# Patient Record
Sex: MALE | Race: WHITE | NOT HISPANIC OR LATINO
[De-identification: names, ages, dates, MRNs, and addresses within clinical notes are randomized per-mention and may not be internally consistent; named-entity substitution may affect disease eponyms.]

---

## 2017-02-03 ENCOUNTER — FOLLOW UP (OUTPATIENT)
Age: 47
End: 2017-02-03

## 2017-02-03 DIAGNOSIS — E11.3311: ICD-10-CM

## 2017-02-03 DIAGNOSIS — Z79.4: ICD-10-CM

## 2017-02-03 DIAGNOSIS — E11.3312: ICD-10-CM

## 2017-02-03 DIAGNOSIS — H18.831: ICD-10-CM

## 2017-02-03 PROCEDURE — 92014 COMPRE OPH EXAM EST PT 1/>: CPT | Mod: 24

## 2017-02-03 PROCEDURE — 92134 CPTRZ OPH DX IMG PST SGM RTA: CPT

## 2017-02-03 ASSESSMENT — VISUAL ACUITY
OD_CC: 20/20
OS_CC: 20/20

## 2017-02-03 ASSESSMENT — TONOMETRY
OD_IOP_MMHG: 13
OS_IOP_MMHG: 12

## 2017-05-05 ENCOUNTER — FOLLOW UP (OUTPATIENT)
Age: 47
End: 2017-05-05

## 2017-05-05 DIAGNOSIS — E11.3311: ICD-10-CM

## 2017-05-05 DIAGNOSIS — E11.3312: ICD-10-CM

## 2017-05-05 DIAGNOSIS — Z79.4: ICD-10-CM

## 2017-05-05 PROCEDURE — 92134 CPTRZ OPH DX IMG PST SGM RTA: CPT

## 2017-05-05 PROCEDURE — 92014 COMPRE OPH EXAM EST PT 1/>: CPT

## 2017-05-05 ASSESSMENT — TONOMETRY
OD_IOP_MMHG: 18
OS_IOP_MMHG: 18

## 2017-05-05 ASSESSMENT — VISUAL ACUITY
OS_CC: 20/20
OD_CC: 20/20

## 2017-06-30 ENCOUNTER — FOLLOW UP (OUTPATIENT)
Age: 47
End: 2017-06-30

## 2017-06-30 DIAGNOSIS — Z79.4: ICD-10-CM

## 2017-06-30 DIAGNOSIS — H18.831: ICD-10-CM

## 2017-06-30 DIAGNOSIS — E11.3311: ICD-10-CM

## 2017-06-30 DIAGNOSIS — E11.3312: ICD-10-CM

## 2017-06-30 PROCEDURE — 67210 TREATMENT OF RETINAL LESION: CPT

## 2017-06-30 PROCEDURE — 92014 COMPRE OPH EXAM EST PT 1/>: CPT | Mod: 57

## 2017-06-30 PROCEDURE — 92134 CPTRZ OPH DX IMG PST SGM RTA: CPT

## 2017-06-30 ASSESSMENT — TONOMETRY
OS_IOP_MMHG: 15
OD_IOP_MMHG: 19

## 2017-06-30 ASSESSMENT — VISUAL ACUITY
OS_CC: 20/20
OD_CC: 20/20

## 2017-08-11 ENCOUNTER — FOLLOW UP (OUTPATIENT)
Age: 47
End: 2017-08-11

## 2017-08-11 DIAGNOSIS — E11.3312: ICD-10-CM

## 2017-08-11 DIAGNOSIS — Z79.4: ICD-10-CM

## 2017-08-11 DIAGNOSIS — E11.3311: ICD-10-CM

## 2017-08-11 PROCEDURE — 92134 CPTRZ OPH DX IMG PST SGM RTA: CPT

## 2017-08-11 PROCEDURE — 92012 INTRM OPH EXAM EST PATIENT: CPT | Mod: 24

## 2017-08-11 ASSESSMENT — VISUAL ACUITY
OD_CC: 20/20
OS_CC: 20/20

## 2017-08-11 ASSESSMENT — TONOMETRY
OS_IOP_MMHG: 16
OD_IOP_MMHG: 15

## 2017-11-03 ENCOUNTER — FOLLOW UP (OUTPATIENT)
Age: 47
End: 2017-11-03

## 2017-11-03 DIAGNOSIS — Z79.4: ICD-10-CM

## 2017-11-03 DIAGNOSIS — H49.22: ICD-10-CM

## 2017-11-03 DIAGNOSIS — E11.3412: ICD-10-CM

## 2017-11-03 DIAGNOSIS — H25.13: ICD-10-CM

## 2017-11-03 DIAGNOSIS — E11.3411: ICD-10-CM

## 2017-11-03 PROCEDURE — 92134 CPTRZ OPH DX IMG PST SGM RTA: CPT

## 2017-11-03 PROCEDURE — 92014 COMPRE OPH EXAM EST PT 1/>: CPT

## 2017-11-03 ASSESSMENT — VISUAL ACUITY
OS_CC: 20/25+1
OD_CC: 20/20

## 2017-11-03 ASSESSMENT — TONOMETRY
OD_IOP_MMHG: 15
OS_IOP_MMHG: 16

## 2017-11-22 ENCOUNTER — FOLLOW UP (OUTPATIENT)
Age: 47
End: 2017-11-22

## 2017-11-22 DIAGNOSIS — E11.3412: ICD-10-CM

## 2017-11-22 DIAGNOSIS — Z79.4: ICD-10-CM

## 2017-11-22 PROCEDURE — 67028 INJECTION EYE DRUG: CPT

## 2017-11-22 ASSESSMENT — TONOMETRY
OS_IOP_MMHG: 12
OD_IOP_MMHG: 13

## 2017-11-22 ASSESSMENT — VISUAL ACUITY
OS_CC: 20/30+
OD_CC: 20/20-

## 2017-12-22 ENCOUNTER — FOLLOW UP (OUTPATIENT)
Age: 47
End: 2017-12-22

## 2017-12-22 DIAGNOSIS — Z79.4: ICD-10-CM

## 2017-12-22 DIAGNOSIS — E11.3411: ICD-10-CM

## 2017-12-22 DIAGNOSIS — E11.3412: ICD-10-CM

## 2017-12-22 PROCEDURE — 92012 INTRM OPH EXAM EST PATIENT: CPT

## 2017-12-22 PROCEDURE — 92134 CPTRZ OPH DX IMG PST SGM RTA: CPT

## 2017-12-22 ASSESSMENT — TONOMETRY
OD_IOP_MMHG: 15
OS_IOP_MMHG: 16

## 2017-12-22 ASSESSMENT — VISUAL ACUITY
OD_CC: 20/20
OS_CC: 20/20

## 2018-01-19 ENCOUNTER — FOLLOW UP (OUTPATIENT)
Age: 48
End: 2018-01-19

## 2018-01-19 DIAGNOSIS — Z79.4: ICD-10-CM

## 2018-01-19 DIAGNOSIS — E11.3412: ICD-10-CM

## 2018-01-19 DIAGNOSIS — E11.3411: ICD-10-CM

## 2018-01-19 PROCEDURE — 92134 CPTRZ OPH DX IMG PST SGM RTA: CPT

## 2018-01-19 PROCEDURE — 92014 COMPRE OPH EXAM EST PT 1/>: CPT | Mod: 25

## 2018-01-19 PROCEDURE — 67028 INJECTION EYE DRUG: CPT

## 2018-01-19 ASSESSMENT — VISUAL ACUITY
OS_CC: 20/20-2
OD_CC: 20/20-1

## 2018-01-19 ASSESSMENT — TONOMETRY
OS_IOP_MMHG: 17
OD_IOP_MMHG: 17

## 2018-02-23 ENCOUNTER — FOLLOW UP (OUTPATIENT)
Age: 48
End: 2018-02-23

## 2018-02-23 DIAGNOSIS — H25.13: ICD-10-CM

## 2018-02-23 DIAGNOSIS — Z79.4: ICD-10-CM

## 2018-02-23 DIAGNOSIS — H49.22: ICD-10-CM

## 2018-02-23 DIAGNOSIS — E11.3412: ICD-10-CM

## 2018-02-23 DIAGNOSIS — E11.3411: ICD-10-CM

## 2018-02-23 PROCEDURE — 67028 INJECTION EYE DRUG: CPT

## 2018-02-23 PROCEDURE — 92134 CPTRZ OPH DX IMG PST SGM RTA: CPT

## 2018-02-23 PROCEDURE — 92012 INTRM OPH EXAM EST PATIENT: CPT | Mod: 25

## 2018-02-23 ASSESSMENT — VISUAL ACUITY
OD_CC: 20/20
OS_CC: 20/20

## 2018-02-23 ASSESSMENT — TONOMETRY
OS_IOP_MMHG: 20
OD_IOP_MMHG: 20

## 2018-04-13 ENCOUNTER — FOLLOW UP (OUTPATIENT)
Age: 48
End: 2018-04-13

## 2018-04-13 DIAGNOSIS — E11.3412: ICD-10-CM

## 2018-04-13 DIAGNOSIS — Z79.4: ICD-10-CM

## 2018-04-13 DIAGNOSIS — H25.13: ICD-10-CM

## 2018-04-13 DIAGNOSIS — E11.3411: ICD-10-CM

## 2018-04-13 PROCEDURE — 92014 COMPRE OPH EXAM EST PT 1/>: CPT

## 2018-04-13 PROCEDURE — 92134 CPTRZ OPH DX IMG PST SGM RTA: CPT

## 2018-04-13 ASSESSMENT — VISUAL ACUITY
OD_CC: 20/20-
OS_CC: 20/20-

## 2018-04-13 ASSESSMENT — TONOMETRY
OD_IOP_MMHG: 15
OS_IOP_MMHG: 16

## 2018-05-25 ENCOUNTER — FOLLOW UP (OUTPATIENT)
Age: 48
End: 2018-05-25

## 2018-05-25 DIAGNOSIS — E11.3412: ICD-10-CM

## 2018-05-25 DIAGNOSIS — Z79.4: ICD-10-CM

## 2018-05-25 DIAGNOSIS — E11.3411: ICD-10-CM

## 2018-05-25 PROCEDURE — 92012 INTRM OPH EXAM EST PATIENT: CPT

## 2018-05-25 PROCEDURE — 92134 CPTRZ OPH DX IMG PST SGM RTA: CPT

## 2018-05-25 ASSESSMENT — VISUAL ACUITY
OD_CC: 20/20
OS_CC: 20/20-

## 2018-05-25 ASSESSMENT — TONOMETRY
OD_IOP_MMHG: 19
OS_IOP_MMHG: 15

## 2018-06-22 ENCOUNTER — UNSCHEDULED FOLLOW UP (OUTPATIENT)
Age: 48
End: 2018-06-22

## 2018-06-22 DIAGNOSIS — E11.3412: ICD-10-CM

## 2018-06-22 DIAGNOSIS — E11.3411: ICD-10-CM

## 2018-06-22 DIAGNOSIS — H25.13: ICD-10-CM

## 2018-06-22 DIAGNOSIS — H49.22: ICD-10-CM

## 2018-06-22 DIAGNOSIS — Z79.4: ICD-10-CM

## 2018-06-22 PROCEDURE — 67028 INJECTION EYE DRUG: CPT | Mod: 50

## 2018-06-22 PROCEDURE — 92014 COMPRE OPH EXAM EST PT 1/>: CPT | Mod: 25

## 2018-06-22 PROCEDURE — 92134 CPTRZ OPH DX IMG PST SGM RTA: CPT

## 2018-06-22 ASSESSMENT — VISUAL ACUITY
OD_CC: 20/50+1
OS_CC: 20/50+2

## 2018-06-22 ASSESSMENT — TONOMETRY
OD_IOP_MMHG: 14
OS_IOP_MMHG: 13

## 2018-07-20 ENCOUNTER — FOLLOW UP (OUTPATIENT)
Age: 48
End: 2018-07-20

## 2018-07-20 DIAGNOSIS — E11.3412: ICD-10-CM

## 2018-07-20 DIAGNOSIS — Z79.4: ICD-10-CM

## 2018-07-20 DIAGNOSIS — E11.3411: ICD-10-CM

## 2018-07-20 PROCEDURE — 92012 INTRM OPH EXAM EST PATIENT: CPT | Mod: 25

## 2018-07-20 PROCEDURE — 92134 CPTRZ OPH DX IMG PST SGM RTA: CPT

## 2018-07-20 PROCEDURE — 67028 INJECTION EYE DRUG: CPT | Mod: 50

## 2018-07-20 ASSESSMENT — VISUAL ACUITY
OS_CC: 20/20-2
OD_CC: 20/20-2

## 2018-07-20 ASSESSMENT — TONOMETRY
OS_IOP_MMHG: 16
OD_IOP_MMHG: 20

## 2018-08-24 ENCOUNTER — FOLLOW UP (OUTPATIENT)
Age: 48
End: 2018-08-24

## 2018-08-24 DIAGNOSIS — H49.22: ICD-10-CM

## 2018-08-24 DIAGNOSIS — Z79.4: ICD-10-CM

## 2018-08-24 DIAGNOSIS — H25.13: ICD-10-CM

## 2018-08-24 DIAGNOSIS — E11.3411: ICD-10-CM

## 2018-08-24 DIAGNOSIS — E11.3412: ICD-10-CM

## 2018-08-24 PROCEDURE — 92014 COMPRE OPH EXAM EST PT 1/>: CPT | Mod: 25

## 2018-08-24 PROCEDURE — 67028 INJECTION EYE DRUG: CPT | Mod: 50

## 2018-08-24 PROCEDURE — 92134 CPTRZ OPH DX IMG PST SGM RTA: CPT

## 2018-08-24 ASSESSMENT — TONOMETRY
OD_IOP_MMHG: 18
OS_IOP_MMHG: 18

## 2018-08-24 ASSESSMENT — VISUAL ACUITY
OS_CC: 20/20
OD_CC: 20/20-2

## 2018-09-21 ENCOUNTER — FOLLOW UP (OUTPATIENT)
Age: 48
End: 2018-09-21

## 2018-09-21 DIAGNOSIS — E11.3412: ICD-10-CM

## 2018-09-21 DIAGNOSIS — E11.3411: ICD-10-CM

## 2018-09-21 DIAGNOSIS — Z79.4: ICD-10-CM

## 2018-09-21 PROCEDURE — 67028 INJECTION EYE DRUG: CPT | Mod: 50

## 2018-09-21 PROCEDURE — 92134 CPTRZ OPH DX IMG PST SGM RTA: CPT

## 2018-09-21 PROCEDURE — 92012 INTRM OPH EXAM EST PATIENT: CPT | Mod: 25

## 2018-09-21 ASSESSMENT — VISUAL ACUITY
OD_CC: 20/20
OS_CC: 20/20-1

## 2018-09-21 ASSESSMENT — TONOMETRY
OS_IOP_MMHG: 17
OD_IOP_MMHG: 14

## 2018-10-19 ENCOUNTER — FOLLOW UP (OUTPATIENT)
Age: 48
End: 2018-10-19

## 2018-10-19 DIAGNOSIS — E11.3412: ICD-10-CM

## 2018-10-19 DIAGNOSIS — Z79.4: ICD-10-CM

## 2018-10-19 DIAGNOSIS — E11.3411: ICD-10-CM

## 2018-10-19 PROCEDURE — 67028 INJECTION EYE DRUG: CPT | Mod: 50

## 2018-10-19 PROCEDURE — 92014 COMPRE OPH EXAM EST PT 1/>: CPT | Mod: 25

## 2018-10-19 PROCEDURE — 92134 CPTRZ OPH DX IMG PST SGM RTA: CPT

## 2018-10-19 ASSESSMENT — TONOMETRY
OD_IOP_MMHG: 13
OS_IOP_MMHG: 16

## 2018-10-19 ASSESSMENT — VISUAL ACUITY
OD_CC: 20/20
OS_CC: 20/20

## 2018-11-16 ENCOUNTER — FOLLOW UP (OUTPATIENT)
Age: 48
End: 2018-11-16

## 2018-11-16 DIAGNOSIS — H49.22: ICD-10-CM

## 2018-11-16 DIAGNOSIS — E11.3411: ICD-10-CM

## 2018-11-16 DIAGNOSIS — E11.3412: ICD-10-CM

## 2018-11-16 DIAGNOSIS — Z79.4: ICD-10-CM

## 2018-11-16 PROCEDURE — 92134 CPTRZ OPH DX IMG PST SGM RTA: CPT

## 2018-11-16 PROCEDURE — 92014 COMPRE OPH EXAM EST PT 1/>: CPT

## 2018-11-16 ASSESSMENT — VISUAL ACUITY
OD_CC: 20/20
OS_CC: 20/30-2
OS_PH: 20/25-1

## 2018-11-16 ASSESSMENT — TONOMETRY
OD_IOP_MMHG: 19
OS_IOP_MMHG: 16

## 2018-12-21 ENCOUNTER — FOLLOW UP (OUTPATIENT)
Age: 48
End: 2018-12-21

## 2018-12-21 DIAGNOSIS — Z79.4: ICD-10-CM

## 2018-12-21 DIAGNOSIS — H49.22: ICD-10-CM

## 2018-12-21 DIAGNOSIS — E11.3412: ICD-10-CM

## 2018-12-21 DIAGNOSIS — E11.3411: ICD-10-CM

## 2018-12-21 PROCEDURE — 92134 CPTRZ OPH DX IMG PST SGM RTA: CPT

## 2018-12-21 PROCEDURE — 92012 INTRM OPH EXAM EST PATIENT: CPT

## 2018-12-21 ASSESSMENT — TONOMETRY
OD_IOP_MMHG: 16
OS_IOP_MMHG: 14

## 2018-12-21 ASSESSMENT — VISUAL ACUITY
OS_CC: 20/25-1
OD_CC: 20/20

## 2019-02-08 ENCOUNTER — FOLLOW UP (OUTPATIENT)
Age: 49
End: 2019-02-08

## 2019-02-08 DIAGNOSIS — E11.3412: ICD-10-CM

## 2019-02-08 DIAGNOSIS — H25.13: ICD-10-CM

## 2019-02-08 DIAGNOSIS — E11.3411: ICD-10-CM

## 2019-02-08 DIAGNOSIS — Z79.4: ICD-10-CM

## 2019-02-08 DIAGNOSIS — H49.22: ICD-10-CM

## 2019-02-08 PROCEDURE — 92014 COMPRE OPH EXAM EST PT 1/>: CPT | Mod: 25

## 2019-02-08 PROCEDURE — 67028 INJECTION EYE DRUG: CPT | Mod: 50

## 2019-02-08 PROCEDURE — 92134 CPTRZ OPH DX IMG PST SGM RTA: CPT

## 2019-02-08 ASSESSMENT — VISUAL ACUITY
OS_CC: 20/20
OD_CC: 20/25+1

## 2019-02-08 ASSESSMENT — TONOMETRY
OS_IOP_MMHG: 17
OD_IOP_MMHG: 16

## 2019-03-15 ENCOUNTER — FOLLOW UP (OUTPATIENT)
Age: 49
End: 2019-03-15

## 2019-03-15 DIAGNOSIS — Z79.4: ICD-10-CM

## 2019-03-15 DIAGNOSIS — E11.3411: ICD-10-CM

## 2019-03-15 DIAGNOSIS — E11.3412: ICD-10-CM

## 2019-03-15 PROCEDURE — 92134 CPTRZ OPH DX IMG PST SGM RTA: CPT

## 2019-03-15 PROCEDURE — 67028 INJECTION EYE DRUG: CPT | Mod: 50

## 2019-03-15 PROCEDURE — 92012 INTRM OPH EXAM EST PATIENT: CPT | Mod: 25

## 2019-03-15 ASSESSMENT — VISUAL ACUITY
OD_CC: 20/25-1
OS_CC: 20/30+2

## 2019-03-15 ASSESSMENT — TONOMETRY
OD_IOP_MMHG: 19
OS_IOP_MMHG: 17

## 2019-04-26 ENCOUNTER — FOLLOW UP (OUTPATIENT)
Age: 49
End: 2019-04-26

## 2019-04-26 DIAGNOSIS — E11.3412: ICD-10-CM

## 2019-04-26 DIAGNOSIS — E11.3411: ICD-10-CM

## 2019-04-26 DIAGNOSIS — Z79.4: ICD-10-CM

## 2019-04-26 PROCEDURE — 92014 COMPRE OPH EXAM EST PT 1/>: CPT | Mod: 25

## 2019-04-26 PROCEDURE — 67028 INJECTION EYE DRUG: CPT | Mod: 50

## 2019-04-26 PROCEDURE — 92134 CPTRZ OPH DX IMG PST SGM RTA: CPT

## 2019-04-26 ASSESSMENT — VISUAL ACUITY
OD_CC: 20/20-1
OS_CC: 20/20

## 2019-04-26 ASSESSMENT — TONOMETRY
OD_IOP_MMHG: 17
OS_IOP_MMHG: 17

## 2019-06-21 ENCOUNTER — FOLLOW UP (OUTPATIENT)
Age: 49
End: 2019-06-21

## 2019-06-21 DIAGNOSIS — E11.3411: ICD-10-CM

## 2019-06-21 DIAGNOSIS — E11.3412: ICD-10-CM

## 2019-06-21 DIAGNOSIS — Z79.4: ICD-10-CM

## 2019-06-21 PROCEDURE — 92014 COMPRE OPH EXAM EST PT 1/>: CPT

## 2019-06-21 PROCEDURE — 92134 CPTRZ OPH DX IMG PST SGM RTA: CPT

## 2019-06-21 PROCEDURE — 67028 INJECTION EYE DRUG: CPT | Mod: 50

## 2019-06-21 ASSESSMENT — TONOMETRY
OD_IOP_MMHG: 15
OS_IOP_MMHG: 17

## 2019-06-21 ASSESSMENT — VISUAL ACUITY
OS_CC: 20/15-2
OD_CC: 20/20

## 2019-08-28 ENCOUNTER — FOLLOW UP (OUTPATIENT)
Age: 49
End: 2019-08-28

## 2019-08-28 DIAGNOSIS — Z79.4: ICD-10-CM

## 2019-08-28 DIAGNOSIS — E11.3411: ICD-10-CM

## 2019-08-28 DIAGNOSIS — E11.3412: ICD-10-CM

## 2019-08-28 PROCEDURE — 92014 COMPRE OPH EXAM EST PT 1/>: CPT | Mod: 25

## 2019-08-28 PROCEDURE — 92134 CPTRZ OPH DX IMG PST SGM RTA: CPT

## 2019-08-28 PROCEDURE — 67028 INJECTION EYE DRUG: CPT | Mod: 50

## 2019-08-28 ASSESSMENT — VISUAL ACUITY
OD_CC: 20/20-1
OS_CC: 20/20

## 2019-08-28 ASSESSMENT — TONOMETRY
OD_IOP_MMHG: 15
OS_IOP_MMHG: 16

## 2019-11-08 ENCOUNTER — FOLLOW UP (OUTPATIENT)
Age: 49
End: 2019-11-08

## 2019-11-08 DIAGNOSIS — E11.3412: ICD-10-CM

## 2019-11-08 DIAGNOSIS — E11.3411: ICD-10-CM

## 2019-11-08 DIAGNOSIS — Z79.4: ICD-10-CM

## 2019-11-08 DIAGNOSIS — H25.13: ICD-10-CM

## 2019-11-08 PROCEDURE — 92014 COMPRE OPH EXAM EST PT 1/>: CPT | Mod: 25

## 2019-11-08 PROCEDURE — 92134 CPTRZ OPH DX IMG PST SGM RTA: CPT

## 2019-11-08 PROCEDURE — 67028 INJECTION EYE DRUG: CPT | Mod: 50

## 2019-11-08 ASSESSMENT — VISUAL ACUITY
OD_CC: 20/20-1
OS_CC: 20/20

## 2019-11-08 ASSESSMENT — TONOMETRY
OS_IOP_MMHG: 17
OD_IOP_MMHG: 14

## 2020-01-24 ENCOUNTER — FOLLOW UP (OUTPATIENT)
Age: 50
End: 2020-01-24

## 2020-01-24 DIAGNOSIS — E11.3412: ICD-10-CM

## 2020-01-24 DIAGNOSIS — Z79.4: ICD-10-CM

## 2020-01-24 DIAGNOSIS — E11.3411: ICD-10-CM

## 2020-01-24 PROCEDURE — 92014 COMPRE OPH EXAM EST PT 1/>: CPT | Mod: 25

## 2020-01-24 PROCEDURE — 92134 CPTRZ OPH DX IMG PST SGM RTA: CPT

## 2020-01-24 PROCEDURE — 67028 INJECTION EYE DRUG: CPT | Mod: 50

## 2020-01-24 ASSESSMENT — VISUAL ACUITY
OS_CC: 20/20
OD_CC: 20/20

## 2020-01-24 ASSESSMENT — TONOMETRY
OD_IOP_MMHG: 13
OS_IOP_MMHG: 13

## 2020-04-03 ENCOUNTER — FOLLOW UP (OUTPATIENT)
Age: 50
End: 2020-04-03

## 2020-04-03 DIAGNOSIS — Z79.4: ICD-10-CM

## 2020-04-03 DIAGNOSIS — E11.3411: ICD-10-CM

## 2020-04-03 DIAGNOSIS — E11.3412: ICD-10-CM

## 2020-04-03 PROCEDURE — 67028 INJECTION EYE DRUG: CPT | Mod: 50

## 2020-04-03 PROCEDURE — 92014 COMPRE OPH EXAM EST PT 1/>: CPT | Mod: 25

## 2020-04-03 PROCEDURE — 92134 CPTRZ OPH DX IMG PST SGM RTA: CPT

## 2020-04-03 ASSESSMENT — TONOMETRY
OS_IOP_MMHG: 21
OD_IOP_MMHG: 13

## 2020-04-03 ASSESSMENT — VISUAL ACUITY
OS_CC: 20/20
OD_CC: 20/20

## 2020-06-10 ENCOUNTER — FOLLOW UP (OUTPATIENT)
Age: 50
End: 2020-06-10

## 2020-06-10 DIAGNOSIS — E11.3411: ICD-10-CM

## 2020-06-10 DIAGNOSIS — Z79.4: ICD-10-CM

## 2020-06-10 DIAGNOSIS — E11.3412: ICD-10-CM

## 2020-06-10 PROCEDURE — 92014 COMPRE OPH EXAM EST PT 1/>: CPT | Mod: 25

## 2020-06-10 PROCEDURE — 67028 INJECTION EYE DRUG: CPT | Mod: 50

## 2020-06-10 PROCEDURE — 92134 CPTRZ OPH DX IMG PST SGM RTA: CPT

## 2020-06-10 ASSESSMENT — VISUAL ACUITY
OD_SC: 20/60-1
OS_PH: 20/25
OS_SC: 20/70-1
OD_PH: 20/25+2

## 2020-06-10 ASSESSMENT — TONOMETRY
OD_IOP_MMHG: 18
OS_IOP_MMHG: 19

## 2020-09-18 ENCOUNTER — FOLLOW UP (OUTPATIENT)
Age: 50
End: 2020-09-18

## 2020-09-18 DIAGNOSIS — Z79.4: ICD-10-CM

## 2020-09-18 DIAGNOSIS — E11.3412: ICD-10-CM

## 2020-09-18 DIAGNOSIS — E11.3411: ICD-10-CM

## 2020-09-18 PROCEDURE — 92134 CPTRZ OPH DX IMG PST SGM RTA: CPT

## 2020-09-18 PROCEDURE — 67028 INJECTION EYE DRUG: CPT | Mod: 50

## 2020-09-18 PROCEDURE — 92014 COMPRE OPH EXAM EST PT 1/>: CPT | Mod: 25

## 2020-09-18 ASSESSMENT — TONOMETRY
OD_IOP_MMHG: 17
OS_IOP_MMHG: 18

## 2020-09-18 ASSESSMENT — VISUAL ACUITY
OS_CC: 20/20-1
OD_CC: 20/20-1

## 2020-12-04 ENCOUNTER — FOLLOW UP (OUTPATIENT)
Age: 50
End: 2020-12-04

## 2020-12-04 DIAGNOSIS — Z79.4: ICD-10-CM

## 2020-12-04 DIAGNOSIS — E11.3412: ICD-10-CM

## 2020-12-04 DIAGNOSIS — E11.3411: ICD-10-CM

## 2020-12-04 PROCEDURE — 92134 CPTRZ OPH DX IMG PST SGM RTA: CPT

## 2020-12-04 PROCEDURE — 92014 COMPRE OPH EXAM EST PT 1/>: CPT

## 2020-12-04 ASSESSMENT — TONOMETRY
OD_IOP_MMHG: 17
OS_IOP_MMHG: 14

## 2020-12-04 ASSESSMENT — VISUAL ACUITY
OD_CC: 20/20
OS_CC: 20/20-1

## 2020-12-11 ENCOUNTER — FOLLOW UP (OUTPATIENT)
Age: 50
End: 2020-12-11

## 2020-12-11 DIAGNOSIS — E11.3411: ICD-10-CM

## 2020-12-11 DIAGNOSIS — E11.3412: ICD-10-CM

## 2020-12-11 PROCEDURE — 67028 INJECTION EYE DRUG: CPT | Mod: 50

## 2020-12-11 ASSESSMENT — TONOMETRY
OS_IOP_MMHG: 18
OD_IOP_MMHG: 13

## 2020-12-11 ASSESSMENT — VISUAL ACUITY
OD_PH: 20/20-2
OD_SC: 20/30-1
OS_SC: 20/40-2

## 2021-03-23 ENCOUNTER — FOLLOW UP (OUTPATIENT)
Age: 51
End: 2021-03-23

## 2021-03-23 DIAGNOSIS — E11.3411: ICD-10-CM

## 2021-03-23 DIAGNOSIS — Z79.4: ICD-10-CM

## 2021-03-23 DIAGNOSIS — H25.13: ICD-10-CM

## 2021-03-23 DIAGNOSIS — E11.3412: ICD-10-CM

## 2021-03-23 PROCEDURE — 92134 CPTRZ OPH DX IMG PST SGM RTA: CPT

## 2021-03-23 PROCEDURE — 99214 OFFICE O/P EST MOD 30 MIN: CPT | Mod: 25

## 2021-03-23 PROCEDURE — 67028 INJECTION EYE DRUG: CPT | Mod: 50

## 2021-03-23 ASSESSMENT — VISUAL ACUITY
OD_CC: 20/60
OS_CC: 20/25

## 2021-03-23 ASSESSMENT — TONOMETRY
OS_IOP_MMHG: 14
OD_IOP_MMHG: 15

## 2021-04-23 ENCOUNTER — FOLLOW UP (OUTPATIENT)
Age: 51
End: 2021-04-23

## 2021-04-23 DIAGNOSIS — E11.3412: ICD-10-CM

## 2021-04-23 DIAGNOSIS — Z79.4: ICD-10-CM

## 2021-04-23 DIAGNOSIS — E11.3411: ICD-10-CM

## 2021-04-23 PROCEDURE — 92134 CPTRZ OPH DX IMG PST SGM RTA: CPT

## 2021-04-23 PROCEDURE — 92014 COMPRE OPH EXAM EST PT 1/>: CPT | Mod: 25

## 2021-04-23 PROCEDURE — 67028 INJECTION EYE DRUG: CPT | Mod: 50

## 2021-04-23 ASSESSMENT — TONOMETRY
OD_IOP_MMHG: 19
OS_IOP_MMHG: 12

## 2021-04-23 ASSESSMENT — VISUAL ACUITY
OS_PH: 20/25-2
OS_SC: 20/40
OD_SC: 20/40

## 2021-06-01 ENCOUNTER — FOLLOW UP (OUTPATIENT)
Age: 51
End: 2021-06-01

## 2021-06-01 DIAGNOSIS — E11.3411: ICD-10-CM

## 2021-06-01 DIAGNOSIS — E11.3412: ICD-10-CM

## 2021-06-01 DIAGNOSIS — Z79.4: ICD-10-CM

## 2021-06-01 PROCEDURE — 92134 CPTRZ OPH DX IMG PST SGM RTA: CPT

## 2021-06-01 PROCEDURE — 67028 INJECTION EYE DRUG: CPT | Mod: 50

## 2021-06-01 PROCEDURE — 99214 OFFICE O/P EST MOD 30 MIN: CPT | Mod: 25

## 2021-06-01 ASSESSMENT — TONOMETRY
OS_IOP_MMHG: 18
OD_IOP_MMHG: 19

## 2021-06-01 ASSESSMENT — VISUAL ACUITY
OS_CC: 20/20
OD_CC: 20/20

## 2021-07-06 ENCOUNTER — FOLLOW UP (OUTPATIENT)
Dept: URBAN - METROPOLITAN AREA CLINIC 78 | Facility: CLINIC | Age: 51
End: 2021-07-06

## 2021-07-06 DIAGNOSIS — Z79.4: ICD-10-CM

## 2021-07-06 DIAGNOSIS — E11.3413: ICD-10-CM

## 2021-07-06 PROCEDURE — PFS EYLEA PFS

## 2021-07-06 PROCEDURE — 67028 INJECTION EYE DRUG: CPT | Mod: 50

## 2021-07-06 PROCEDURE — 92014 COMPRE OPH EXAM EST PT 1/>: CPT | Mod: 25

## 2021-07-06 PROCEDURE — 92134 CPTRZ OPH DX IMG PST SGM RTA: CPT

## 2021-07-06 ASSESSMENT — VISUAL ACUITY
OD_CC: 20/20
OS_CC: 20/20

## 2021-07-06 ASSESSMENT — TONOMETRY
OD_IOP_MMHG: 17
OS_IOP_MMHG: 23

## 2021-08-10 ENCOUNTER — FOLLOW UP (OUTPATIENT)
Dept: URBAN - METROPOLITAN AREA CLINIC 78 | Facility: CLINIC | Age: 51
End: 2021-08-10

## 2021-08-10 DIAGNOSIS — E11.3413: ICD-10-CM

## 2021-08-10 DIAGNOSIS — Z79.4: ICD-10-CM

## 2021-08-10 PROCEDURE — PFS EYLEA PFS

## 2021-08-10 PROCEDURE — 99214 OFFICE O/P EST MOD 30 MIN: CPT | Mod: 25

## 2021-08-10 PROCEDURE — 92134 CPTRZ OPH DX IMG PST SGM RTA: CPT

## 2021-08-10 PROCEDURE — 67028 INJECTION EYE DRUG: CPT | Mod: 50

## 2021-08-10 ASSESSMENT — TONOMETRY
OS_IOP_MMHG: 15
OD_IOP_MMHG: 17

## 2021-08-10 ASSESSMENT — VISUAL ACUITY
OS_SC: 20/20
OD_SC: 20/25

## 2021-09-14 ENCOUNTER — FOLLOW UP (OUTPATIENT)
Dept: URBAN - METROPOLITAN AREA CLINIC 78 | Facility: CLINIC | Age: 51
End: 2021-09-14

## 2021-09-14 DIAGNOSIS — Z79.4: ICD-10-CM

## 2021-09-14 DIAGNOSIS — E11.3413: ICD-10-CM

## 2021-09-14 PROCEDURE — 67028 INJECTION EYE DRUG: CPT | Mod: 50

## 2021-09-14 PROCEDURE — 92134 CPTRZ OPH DX IMG PST SGM RTA: CPT

## 2021-09-14 PROCEDURE — 92014 COMPRE OPH EXAM EST PT 1/>: CPT | Mod: 25

## 2021-09-14 ASSESSMENT — VISUAL ACUITY
OD_CC: 20/20
OS_CC: 20/20-3

## 2021-09-14 ASSESSMENT — TONOMETRY
OD_IOP_MMHG: 15
OS_IOP_MMHG: 15

## 2021-11-30 ENCOUNTER — FOLLOW UP (OUTPATIENT)
Dept: URBAN - METROPOLITAN AREA CLINIC 78 | Facility: CLINIC | Age: 51
End: 2021-11-30

## 2021-11-30 DIAGNOSIS — E11.3413: ICD-10-CM

## 2021-11-30 DIAGNOSIS — Z79.4: ICD-10-CM

## 2021-11-30 DIAGNOSIS — H25.13: ICD-10-CM

## 2021-11-30 PROCEDURE — 67028 INJECTION EYE DRUG: CPT | Mod: 50

## 2021-11-30 PROCEDURE — 92134 CPTRZ OPH DX IMG PST SGM RTA: CPT

## 2021-11-30 PROCEDURE — 99214 OFFICE O/P EST MOD 30 MIN: CPT | Mod: 25

## 2021-11-30 PROCEDURE — PFS EYLEA PFS

## 2021-11-30 ASSESSMENT — VISUAL ACUITY
OD_CC: 20/20-2
OS_CC: 20/25

## 2021-11-30 ASSESSMENT — TONOMETRY
OD_IOP_MMHG: 13
OS_IOP_MMHG: 13

## 2022-02-08 ENCOUNTER — FOLLOW UP (OUTPATIENT)
Dept: URBAN - METROPOLITAN AREA CLINIC 78 | Facility: CLINIC | Age: 52
End: 2022-02-08

## 2022-02-08 DIAGNOSIS — E11.3413: ICD-10-CM

## 2022-02-08 DIAGNOSIS — Z79.4: ICD-10-CM

## 2022-02-08 PROCEDURE — 92134 CPTRZ OPH DX IMG PST SGM RTA: CPT

## 2022-02-08 PROCEDURE — 92014 COMPRE OPH EXAM EST PT 1/>: CPT | Mod: 25

## 2022-02-08 PROCEDURE — 67028 INJECTION EYE DRUG: CPT | Mod: 50

## 2022-02-08 PROCEDURE — PFS EYLEA PFS

## 2022-02-08 ASSESSMENT — VISUAL ACUITY
OD_CC: 20/20
OS_CC: 20/20

## 2022-02-08 ASSESSMENT — TONOMETRY
OD_IOP_MMHG: 17
OS_IOP_MMHG: 18

## 2022-04-19 ENCOUNTER — FOLLOW UP (OUTPATIENT)
Dept: URBAN - METROPOLITAN AREA CLINIC 78 | Facility: CLINIC | Age: 52
End: 2022-04-19

## 2022-04-19 DIAGNOSIS — Z79.4: ICD-10-CM

## 2022-04-19 DIAGNOSIS — E11.3413: ICD-10-CM

## 2022-04-19 DIAGNOSIS — H25.13: ICD-10-CM

## 2022-04-19 PROCEDURE — PFS EYLEA PFS

## 2022-04-19 PROCEDURE — 99214 OFFICE O/P EST MOD 30 MIN: CPT | Mod: 25

## 2022-04-19 PROCEDURE — 67028 INJECTION EYE DRUG: CPT | Mod: 50

## 2022-04-19 PROCEDURE — 92134 CPTRZ OPH DX IMG PST SGM RTA: CPT

## 2022-04-19 ASSESSMENT — TONOMETRY
OD_IOP_MMHG: 20
OS_IOP_MMHG: 22

## 2022-04-19 ASSESSMENT — VISUAL ACUITY
OS_CC: 20/20
OD_CC: 20/20-2

## 2022-06-14 ENCOUNTER — FOLLOW UP (OUTPATIENT)
Dept: URBAN - METROPOLITAN AREA CLINIC 78 | Facility: CLINIC | Age: 52
End: 2022-06-14

## 2022-06-14 DIAGNOSIS — H25.13: ICD-10-CM

## 2022-06-14 DIAGNOSIS — E11.3413: ICD-10-CM

## 2022-06-14 DIAGNOSIS — Z79.4: ICD-10-CM

## 2022-06-14 PROCEDURE — 92014 COMPRE OPH EXAM EST PT 1/>: CPT | Mod: 25

## 2022-06-14 PROCEDURE — PFS EYLEA PFS

## 2022-06-14 PROCEDURE — 92134 CPTRZ OPH DX IMG PST SGM RTA: CPT

## 2022-06-14 PROCEDURE — 67028 INJECTION EYE DRUG: CPT | Mod: 50

## 2022-06-14 ASSESSMENT — TONOMETRY
OS_IOP_MMHG: 15
OD_IOP_MMHG: 12

## 2022-06-14 ASSESSMENT — VISUAL ACUITY
OS_SC: 20/20
OD_SC: 20/25

## 2022-08-09 ENCOUNTER — FOLLOW UP (OUTPATIENT)
Dept: URBAN - METROPOLITAN AREA CLINIC 78 | Facility: CLINIC | Age: 52
End: 2022-08-09

## 2022-08-09 DIAGNOSIS — E11.3413: ICD-10-CM

## 2022-08-09 DIAGNOSIS — Z79.4: ICD-10-CM

## 2022-08-09 PROCEDURE — 92134 CPTRZ OPH DX IMG PST SGM RTA: CPT

## 2022-08-09 PROCEDURE — 67028 INJECTION EYE DRUG: CPT | Mod: 50

## 2022-08-09 PROCEDURE — PFS EYLEA PFS

## 2022-08-09 PROCEDURE — 92014 COMPRE OPH EXAM EST PT 1/>: CPT | Mod: 25

## 2022-08-09 ASSESSMENT — VISUAL ACUITY
OD_SC: 20/25
OS_SC: 20/20

## 2022-08-09 ASSESSMENT — TONOMETRY
OS_IOP_MMHG: 17
OD_IOP_MMHG: 16

## 2022-10-18 ENCOUNTER — FOLLOW UP (OUTPATIENT)
Dept: URBAN - METROPOLITAN AREA CLINIC 78 | Facility: CLINIC | Age: 52
End: 2022-10-18

## 2022-10-18 DIAGNOSIS — E11.3413: ICD-10-CM

## 2022-10-18 DIAGNOSIS — Z79.4: ICD-10-CM

## 2022-10-18 DIAGNOSIS — H25.13: ICD-10-CM

## 2022-10-18 PROCEDURE — 92014 COMPRE OPH EXAM EST PT 1/>: CPT | Mod: 25

## 2022-10-18 PROCEDURE — 92134 CPTRZ OPH DX IMG PST SGM RTA: CPT

## 2022-10-18 PROCEDURE — 67028 INJECTION EYE DRUG: CPT | Mod: 50

## 2022-10-18 PROCEDURE — PFS EYLEA PFS

## 2022-10-18 ASSESSMENT — TONOMETRY
OD_IOP_MMHG: 14
OS_IOP_MMHG: 13

## 2022-10-18 ASSESSMENT — VISUAL ACUITY
OD_SC: 20/25
OS_SC: 20/20

## 2023-01-13 ENCOUNTER — FOLLOW UP (OUTPATIENT)
Dept: URBAN - METROPOLITAN AREA CLINIC 78 | Facility: CLINIC | Age: 53
End: 2023-01-13

## 2023-01-13 DIAGNOSIS — Z79.4: ICD-10-CM

## 2023-01-13 DIAGNOSIS — E11.3413: ICD-10-CM

## 2023-01-13 PROCEDURE — 92134 CPTRZ OPH DX IMG PST SGM RTA: CPT

## 2023-01-13 PROCEDURE — 92014 COMPRE OPH EXAM EST PT 1/>: CPT

## 2023-01-13 ASSESSMENT — VISUAL ACUITY
OD_SC: 20/25
OS_SC: 20/20

## 2023-01-13 ASSESSMENT — TONOMETRY
OD_IOP_MMHG: 17
OS_IOP_MMHG: 17

## 2023-04-07 ENCOUNTER — FOLLOW UP (OUTPATIENT)
Dept: URBAN - METROPOLITAN AREA CLINIC 78 | Facility: CLINIC | Age: 53
End: 2023-04-07

## 2023-04-07 DIAGNOSIS — E11.3413: ICD-10-CM

## 2023-04-07 DIAGNOSIS — H25.13: ICD-10-CM

## 2023-04-07 DIAGNOSIS — Z79.4: ICD-10-CM

## 2023-04-07 PROCEDURE — PFS EYLEA PFS

## 2023-04-07 PROCEDURE — 67028 INJECTION EYE DRUG: CPT | Mod: 50

## 2023-04-07 PROCEDURE — 92134 CPTRZ OPH DX IMG PST SGM RTA: CPT

## 2023-04-07 PROCEDURE — 99214 OFFICE O/P EST MOD 30 MIN: CPT | Mod: 25

## 2023-04-07 ASSESSMENT — VISUAL ACUITY
OS_SC: 20/40+2
OD_SC: 20/40

## 2023-04-07 ASSESSMENT — TONOMETRY
OD_IOP_MMHG: 13
OS_IOP_MMHG: 18

## 2023-06-09 ENCOUNTER — FOLLOW UP (OUTPATIENT)
Dept: URBAN - METROPOLITAN AREA CLINIC 78 | Facility: CLINIC | Age: 53
End: 2023-06-09

## 2023-06-09 DIAGNOSIS — Z79.4: ICD-10-CM

## 2023-06-09 DIAGNOSIS — E11.3413: ICD-10-CM

## 2023-06-09 DIAGNOSIS — H25.13: ICD-10-CM

## 2023-06-09 PROCEDURE — PFS EYLEA PFS

## 2023-06-09 PROCEDURE — 92134 CPTRZ OPH DX IMG PST SGM RTA: CPT

## 2023-06-09 PROCEDURE — 67028 INJECTION EYE DRUG: CPT | Mod: 50

## 2023-06-09 PROCEDURE — 92014 COMPRE OPH EXAM EST PT 1/>: CPT | Mod: 25

## 2023-06-09 ASSESSMENT — TONOMETRY
OD_IOP_MMHG: 17
OS_IOP_MMHG: 19

## 2023-06-09 ASSESSMENT — VISUAL ACUITY
OS_SC: 20/25
OD_SC: 20/40+1

## 2023-08-08 ENCOUNTER — FOLLOW UP (OUTPATIENT)
Dept: URBAN - METROPOLITAN AREA CLINIC 78 | Facility: CLINIC | Age: 53
End: 2023-08-08

## 2023-08-08 DIAGNOSIS — E11.3413: ICD-10-CM

## 2023-08-08 DIAGNOSIS — Z79.4: ICD-10-CM

## 2023-08-08 PROCEDURE — 92014 COMPRE OPH EXAM EST PT 1/>: CPT | Mod: 25

## 2023-08-08 PROCEDURE — 67028 INJECTION EYE DRUG: CPT | Mod: 50

## 2023-08-08 PROCEDURE — 92134 CPTRZ OPH DX IMG PST SGM RTA: CPT

## 2023-08-08 PROCEDURE — PFS EYLEA PFS: Mod: JZ

## 2023-08-08 ASSESSMENT — VISUAL ACUITY
OS_SC: 20/30+2
OD_SC: 20/40-2

## 2023-08-08 ASSESSMENT — TONOMETRY
OD_IOP_MMHG: 18
OS_IOP_MMHG: 17

## 2023-10-03 ENCOUNTER — FOLLOW UP (OUTPATIENT)
Dept: URBAN - METROPOLITAN AREA CLINIC 78 | Facility: CLINIC | Age: 53
End: 2023-10-03

## 2023-10-03 DIAGNOSIS — E11.3413: ICD-10-CM

## 2023-10-03 DIAGNOSIS — Z79.4: ICD-10-CM

## 2023-10-03 PROCEDURE — 92014 COMPRE OPH EXAM EST PT 1/>: CPT | Mod: 25

## 2023-10-03 PROCEDURE — 92250 FUNDUS PHOTOGRAPHY W/I&R: CPT

## 2023-10-03 PROCEDURE — 92134 CPTRZ OPH DX IMG PST SGM RTA: CPT | Mod: NC

## 2023-10-03 PROCEDURE — 67028 INJECTION EYE DRUG: CPT | Mod: 50

## 2023-10-03 PROCEDURE — PFS EYLEA PFS: Mod: JZ

## 2023-10-03 ASSESSMENT — VISUAL ACUITY
OS_SC: 20/25
OD_SC: 20/40-1

## 2023-10-03 ASSESSMENT — TONOMETRY
OS_IOP_MMHG: 16
OD_IOP_MMHG: 15

## 2023-10-12 ENCOUNTER — APPOINTMENT (RX ONLY)
Dept: URBAN - METROPOLITAN AREA CLINIC 341 | Facility: CLINIC | Age: 53
Setting detail: DERMATOLOGY
End: 2023-10-12

## 2023-10-12 DIAGNOSIS — L82.1 OTHER SEBORRHEIC KERATOSIS: ICD-10-CM | Status: INADEQUATELY CONTROLLED

## 2023-10-12 DIAGNOSIS — L82.0 INFLAMED SEBORRHEIC KERATOSIS: ICD-10-CM | Status: INADEQUATELY CONTROLLED

## 2023-10-12 DIAGNOSIS — B36.0 PITYRIASIS VERSICOLOR: ICD-10-CM | Status: INADEQUATELY CONTROLLED

## 2023-10-12 PROCEDURE — ? LIQUID NITROGEN

## 2023-10-12 PROCEDURE — ? PRESCRIPTION MEDICATION MANAGEMENT

## 2023-10-12 PROCEDURE — ? COUNSELING

## 2023-10-12 PROCEDURE — 17110 DESTRUCTION B9 LES UP TO 14: CPT

## 2023-10-12 PROCEDURE — 99203 OFFICE O/P NEW LOW 30 MIN: CPT | Mod: 25

## 2023-10-12 PROCEDURE — ? PRESCRIPTION

## 2023-10-12 RX ORDER — KETOCONAZOLE 20 MG/G
CREAM TOPICAL BID
Qty: 60 | Refills: 2 | Status: ERX | COMMUNITY
Start: 2023-10-12

## 2023-10-12 RX ADMIN — KETOCONAZOLE: 20 CREAM TOPICAL at 00:00

## 2023-10-12 ASSESSMENT — LOCATION DETAILED DESCRIPTION DERM
LOCATION DETAILED: LEFT MEDIAL INFERIOR CHEST
LOCATION DETAILED: RIGHT CLAVICULAR SKIN
LOCATION DETAILED: LEFT INFERIOR ANTERIOR NECK
LOCATION DETAILED: STERNUM

## 2023-10-12 ASSESSMENT — LOCATION SIMPLE DESCRIPTION DERM
LOCATION SIMPLE: RIGHT CLAVICULAR SKIN
LOCATION SIMPLE: LEFT ANTERIOR NECK
LOCATION SIMPLE: CHEST

## 2023-10-12 ASSESSMENT — LOCATION ZONE DERM
LOCATION ZONE: NECK
LOCATION ZONE: TRUNK

## 2023-10-12 NOTE — PROCEDURE: LIQUID NITROGEN
Show Aperture Variable?: Yes
Spray Paint Text: The liquid nitrogen was applied to the skin utilizing a spray paint frosting technique.
Post-Care Instructions: I reviewed with the patient in detail post-care instructions. Patient is to wear sunprotection, and avoid picking at any of the treated lesions. Pt may apply Vaseline to crusted or scabbing areas.
Spray Paint Technique: No
Medical Necessity Clause: This procedure was medically necessary because the lesions that were treated were:
Medical Necessity Information: It is in your best interest to select a reason for this procedure from the list below. All of these items fulfill various CMS LCD requirements except the new and changing color options.
Consent: The patient's consent was obtained including but not limited to risks of crusting, scabbing, blistering, scarring, darker or lighter pigmentary change, recurrence, incomplete removal and infection.
Detail Level: Detailed

## 2023-10-12 NOTE — PROCEDURE: PRESCRIPTION MEDICATION MANAGEMENT
Render In Strict Bullet Format?: No
Detail Level: Zone
Initiate Treatment: ketoconazole 2 % topical cream \\napply to affected areas of the body twice daily for two weeks and then as needed for flares

## 2023-11-28 ENCOUNTER — FOLLOW UP (OUTPATIENT)
Dept: URBAN - METROPOLITAN AREA CLINIC 78 | Facility: CLINIC | Age: 53
End: 2023-11-28

## 2023-11-28 DIAGNOSIS — Z79.4: ICD-10-CM

## 2023-11-28 DIAGNOSIS — H25.13: ICD-10-CM

## 2023-11-28 DIAGNOSIS — E11.3413: ICD-10-CM

## 2023-11-28 PROCEDURE — 92014 COMPRE OPH EXAM EST PT 1/>: CPT | Mod: 25

## 2023-11-28 PROCEDURE — 67028 INJECTION EYE DRUG: CPT | Mod: 50

## 2023-11-28 PROCEDURE — 92134 CPTRZ OPH DX IMG PST SGM RTA: CPT

## 2023-11-28 PROCEDURE — PFS EYLEA PFS: Mod: JZ

## 2023-11-28 ASSESSMENT — TONOMETRY
OS_IOP_MMHG: 15
OD_IOP_MMHG: 21

## 2023-11-28 ASSESSMENT — VISUAL ACUITY
OS_SC: 20/25-1
OD_SC: 20/50-1

## 2024-01-05 ENCOUNTER — UNSCHEDULED FOLLOW UP (OUTPATIENT)
Dept: URBAN - METROPOLITAN AREA CLINIC 78 | Facility: CLINIC | Age: 54
End: 2024-01-05

## 2024-01-05 DIAGNOSIS — H25.041: ICD-10-CM

## 2024-01-05 DIAGNOSIS — E11.3413: ICD-10-CM

## 2024-01-05 DIAGNOSIS — Z79.4: ICD-10-CM

## 2024-01-05 PROCEDURE — 92250 FUNDUS PHOTOGRAPHY W/I&R: CPT

## 2024-01-05 PROCEDURE — 92014 COMPRE OPH EXAM EST PT 1/>: CPT

## 2024-01-05 PROCEDURE — 92134 CPTRZ OPH DX IMG PST SGM RTA: CPT | Mod: NC

## 2024-01-05 ASSESSMENT — TONOMETRY
OS_IOP_MMHG: 13
OD_IOP_MMHG: 13

## 2024-01-05 ASSESSMENT — VISUAL ACUITY
OS_SC: 20/25
OD_SC: 20/50-2

## 2024-01-23 ENCOUNTER — FOLLOW UP (OUTPATIENT)
Dept: URBAN - METROPOLITAN AREA CLINIC 78 | Facility: CLINIC | Age: 54
End: 2024-01-23

## 2024-01-23 DIAGNOSIS — E11.3413: ICD-10-CM

## 2024-01-23 DIAGNOSIS — H25.041: ICD-10-CM

## 2024-01-23 DIAGNOSIS — Z79.4: ICD-10-CM

## 2024-01-23 PROCEDURE — 67028 INJECTION EYE DRUG: CPT | Mod: 50

## 2024-01-23 PROCEDURE — PFS EYLEA PFS: Mod: JZ

## 2024-01-23 PROCEDURE — 99214 OFFICE O/P EST MOD 30 MIN: CPT | Mod: 25

## 2024-01-23 PROCEDURE — 92134 CPTRZ OPH DX IMG PST SGM RTA: CPT

## 2024-01-23 ASSESSMENT — VISUAL ACUITY
OD_SC: 20/60
OS_SC: 20/20

## 2024-01-23 ASSESSMENT — TONOMETRY
OS_IOP_MMHG: 17
OD_IOP_MMHG: 17

## 2024-03-26 ENCOUNTER — FOLLOW UP (OUTPATIENT)
Dept: URBAN - METROPOLITAN AREA CLINIC 78 | Facility: CLINIC | Age: 54
End: 2024-03-26

## 2024-03-26 DIAGNOSIS — E11.3413: ICD-10-CM

## 2024-03-26 DIAGNOSIS — Z79.4: ICD-10-CM

## 2024-03-26 PROCEDURE — 67028 INJECTION EYE DRUG: CPT | Mod: 50

## 2024-03-26 PROCEDURE — 92014 COMPRE OPH EXAM EST PT 1/>: CPT | Mod: 25

## 2024-03-26 PROCEDURE — 92134 CPTRZ OPH DX IMG PST SGM RTA: CPT

## 2024-03-26 PROCEDURE — PFS EYLEA PFS: Mod: JZ

## 2024-03-26 ASSESSMENT — TONOMETRY
OD_IOP_MMHG: 14
OS_IOP_MMHG: 17

## 2024-03-26 ASSESSMENT — VISUAL ACUITY
OS_SC: 20/20-2
OD_SC: 20/40

## 2024-05-03 ENCOUNTER — UNSCHEDULED FOLLOW UP (OUTPATIENT)
Dept: URBAN - METROPOLITAN AREA CLINIC 78 | Facility: CLINIC | Age: 54
End: 2024-05-03

## 2024-05-03 DIAGNOSIS — Z79.4: ICD-10-CM

## 2024-05-03 DIAGNOSIS — E11.3413: ICD-10-CM

## 2024-05-03 PROCEDURE — 92012 INTRM OPH EXAM EST PATIENT: CPT

## 2024-05-03 PROCEDURE — 92134 CPTRZ OPH DX IMG PST SGM RTA: CPT

## 2024-05-03 ASSESSMENT — TONOMETRY
OD_IOP_MMHG: 12
OS_IOP_MMHG: 15

## 2024-05-03 ASSESSMENT — VISUAL ACUITY
OD_SC: 20/40+2
OS_SC: 20/30+2

## 2024-07-12 ENCOUNTER — FOLLOW UP (OUTPATIENT)
Dept: URBAN - METROPOLITAN AREA CLINIC 78 | Facility: CLINIC | Age: 54
End: 2024-07-12

## 2024-07-12 DIAGNOSIS — E11.3413: ICD-10-CM

## 2024-07-12 DIAGNOSIS — Z79.4: ICD-10-CM

## 2024-07-12 PROCEDURE — 92134 CPTRZ OPH DX IMG PST SGM RTA: CPT

## 2024-07-12 PROCEDURE — 67028 INJECTION EYE DRUG: CPT | Mod: 50

## 2024-07-12 PROCEDURE — 99214 OFFICE O/P EST MOD 30 MIN: CPT | Mod: 25

## 2024-07-12 ASSESSMENT — TONOMETRY
OD_IOP_MMHG: 15
OS_IOP_MMHG: 12

## 2024-07-12 ASSESSMENT — VISUAL ACUITY
OD_SC: 20/50
OS_SC: 20/40

## 2024-09-23 ENCOUNTER — FOLLOW UP (OUTPATIENT)
Dept: URBAN - METROPOLITAN AREA CLINIC 77 | Facility: CLINIC | Age: 54
End: 2024-09-23

## 2024-09-23 DIAGNOSIS — Z79.4: ICD-10-CM

## 2024-09-23 DIAGNOSIS — E11.3413: ICD-10-CM

## 2024-09-23 PROCEDURE — 67028 INJECTION EYE DRUG: CPT | Mod: 50

## 2024-09-23 PROCEDURE — 92134 CPTRZ OPH DX IMG PST SGM RTA: CPT

## 2024-09-23 PROCEDURE — 92014 COMPRE OPH EXAM EST PT 1/>: CPT | Mod: 25

## 2024-09-23 ASSESSMENT — TONOMETRY
OS_IOP_MMHG: 12
OD_IOP_MMHG: 13

## 2024-09-23 ASSESSMENT — VISUAL ACUITY
OS_SC: 20/25-2
OD_SC: 20/40+1

## 2024-12-16 ENCOUNTER — FOLLOW UP (OUTPATIENT)
Dept: URBAN - METROPOLITAN AREA CLINIC 77 | Facility: CLINIC | Age: 54
End: 2024-12-16

## 2024-12-16 DIAGNOSIS — E11.3413: ICD-10-CM

## 2024-12-16 DIAGNOSIS — Z79.4: ICD-10-CM

## 2024-12-16 PROCEDURE — 67028 INJECTION EYE DRUG: CPT | Mod: 50

## 2024-12-16 PROCEDURE — 92014 COMPRE OPH EXAM EST PT 1/>: CPT | Mod: 25

## 2024-12-16 PROCEDURE — 92134 CPTRZ OPH DX IMG PST SGM RTA: CPT

## 2024-12-16 ASSESSMENT — TONOMETRY
OD_IOP_MMHG: 13
OS_IOP_MMHG: 10

## 2024-12-16 ASSESSMENT — VISUAL ACUITY
OS_SC: 20/60+1
OD_SC: 20/50+1

## 2025-02-17 ENCOUNTER — FOLLOW UP (OUTPATIENT)
Dept: URBAN - METROPOLITAN AREA CLINIC 77 | Facility: CLINIC | Age: 55
End: 2025-02-17

## 2025-02-17 DIAGNOSIS — E11.3413: ICD-10-CM

## 2025-02-17 DIAGNOSIS — Z79.4: ICD-10-CM

## 2025-02-17 PROCEDURE — 92014 COMPRE OPH EXAM EST PT 1/>: CPT | Mod: 25

## 2025-02-17 PROCEDURE — 67028 INJECTION EYE DRUG: CPT | Mod: 50

## 2025-02-17 PROCEDURE — 92250 FUNDUS PHOTOGRAPHY W/I&R: CPT

## 2025-02-17 PROCEDURE — 92134 CPTRZ OPH DX IMG PST SGM RTA: CPT | Mod: NC

## 2025-02-17 ASSESSMENT — VISUAL ACUITY
OD_SC: 20/40
OS_SC: 20/25-1

## 2025-02-17 ASSESSMENT — TONOMETRY
OD_IOP_MMHG: 10
OS_IOP_MMHG: 10

## 2025-04-21 ENCOUNTER — FOLLOW UP (OUTPATIENT)
Dept: URBAN - METROPOLITAN AREA CLINIC 77 | Facility: CLINIC | Age: 55
End: 2025-04-21

## 2025-04-21 DIAGNOSIS — Z79.4: ICD-10-CM

## 2025-04-21 DIAGNOSIS — E11.3413: ICD-10-CM

## 2025-04-21 PROCEDURE — 92014 COMPRE OPH EXAM EST PT 1/>: CPT | Mod: 25

## 2025-04-21 PROCEDURE — 92134 CPTRZ OPH DX IMG PST SGM RTA: CPT

## 2025-04-21 PROCEDURE — 67028 INJECTION EYE DRUG: CPT | Mod: 50

## 2025-04-21 ASSESSMENT — TONOMETRY
OS_IOP_MMHG: 18
OD_IOP_MMHG: 14

## 2025-04-21 ASSESSMENT — VISUAL ACUITY
OS_SC: 20/30+2
OD_SC: 20/50